# Patient Record
Sex: FEMALE | Race: WHITE
[De-identification: names, ages, dates, MRNs, and addresses within clinical notes are randomized per-mention and may not be internally consistent; named-entity substitution may affect disease eponyms.]

---

## 2018-11-21 ENCOUNTER — HOSPITAL ENCOUNTER (EMERGENCY)
Dept: HOSPITAL 97 - ER | Age: 73
Discharge: HOME | End: 2018-11-21
Payer: COMMERCIAL

## 2018-11-21 DIAGNOSIS — M25.511: Primary | ICD-10-CM

## 2018-11-21 DIAGNOSIS — Z88.2: ICD-10-CM

## 2018-11-21 DIAGNOSIS — I10: ICD-10-CM

## 2018-11-21 PROCEDURE — 99283 EMERGENCY DEPT VISIT LOW MDM: CPT

## 2018-11-21 PROCEDURE — 71046 X-RAY EXAM CHEST 2 VIEWS: CPT

## 2018-11-21 NOTE — ER
Nurse's Notes                                                                                     

 [facility]                                                                

Name: Barrie Jha                                                                                

Age: 73 yrs                                                                                       

Sex: Female                                                                                       

: 1945                                                                                   

MRN: [id number]                                                                                   

Arrival Date: 2018                                                                          

Time: 14:15                                                                                       

Account#: [id number]                                                                            

Bed 30                                                                                            

Private MD: Ghassan Sin T                                                                        

Diagnosis: Pain in right shoulder                                                                 

                                                                                                  

Presentation:                                                                                     

                                                                                             

14:30 Presenting complaint: Patient states: "I fell last night directly on my (right)         aj1 

      shoulder. My  made me this sling so I could sleep. I thought I could see my          

      doctor today, but when I called they said just come to the ER" Limited ROM noted to         

      right shoulder. Transition of care: patient was not received from another setting of        

      care. Onset of symptoms was 2018 at 21:00. Risk Assessment: Do you want to     

      hurt yourself or someone else? Patient reports no desire to harm self or others.            

      Initial Sepsis Screen: Does the patient meet any 2 criteria? No. Patient's initial          

      sepsis screen is negative. Does the patient have a suspected source of infection? No.       

      Patient's initial sepsis screen is negative. Care prior to arrival: None.                   

14:30 Method Of Arrival: Ambulatory                                                           [facility] 

14:30 Acuity: VIANEY 4                                                                           aj1 

                                                                                                  

Triage Assessment:                                                                                

14:33 General: Appears in no apparent distress. uncomfortable, Behavior is calm, cooperative, aj1 

      appropriate for age. Pain: Complains of pain in anterior aspect of right shoulder and       

      posterior aspect of right shoulder. Pain: Pain currently is 0 out of 10 on a pain           

      scale. at worst was 10 out of 10 on a pain scale. Neuro: Level of Consciousness is          

      awake, alert, obeys commands. Cardiovascular: Patient's skin is warm and dry.               

      Respiratory: Airway is patent Respiratory effort is even, unlabored, Respiratory            

      pattern is regular, symmetrical. Musculoskeletal: Range of motion: limited in right         

      shoulder. Injury Description: Patient reports fall from standing. States that she does      

      not have any pain unless she is moving her arm.                                             

                                                                                                  

Historical:                                                                                       

- Allergies:                                                                                      

14:33 Sulfa (Sulfonamide Antibiotics);                                                        aj1 

- Home Meds:                                                                                      

14:33 losartan oral oral [Active]; "an antihistamine" [Active];                               aj1 

- PMHx:                                                                                           

14:33 Hypertension;                                                                           aj1 

                                                                                                  

- Immunization history:: Flu vaccine is up to date.                                               

- Social history:: Smoking status: Patient/guardian denies using tobacco.                         

- Ebola Screening: : Patient denies travel to an Ebola-affected area in the 21 days               

  before illness onset.                                                                           

                                                                                                  

                                                                                                  

Screening:                                                                                        

15:20 Abuse screen: Denies threats or abuse. Denies injuries from another. Nutritional        iw  

      screening: No deficits noted. Tuberculosis screening: No symptoms or risk factors           

      identified. Fall Risk Fall in past 12 months (25 points).                                   

                                                                                                  

Assessment:                                                                                       

15:20 General: Appears in no apparent distress. Behavior is calm, cooperative. Pain:          iw  

      Complains of pain in posterior aspect of right shoulder and anterior aspect of right        

      shoulder. Neuro: Level of Consciousness is awake, alert, obeys commands, Oriented to        

      person, place, time, situation, Moves all extremities. Full function. Cardiovascular:       

      Capillary refill < 3 seconds in bilateral fingers Patient's skin is warm and dry.           

      Respiratory: Respiratory effort is even, unlabored, Respiratory pattern is regular,         

      symmetrical. GI: No signs and/or symptoms were reported involving the gastrointestinal      

      system. Derm: Skin is intact, is healthy with good turgor. Musculoskeletal: Range of        

      motion: intact in all extremities, Reports pain in right shoulder.                          

                                                                                                  

Vital Signs:                                                                                      

14:33  / 87; Pulse 69; Resp 18; Temp 98.4; Pulse Ox 98% on R/A; Weight 72.57 kg (R);    aj1 

      Height 5 ft. 7 in. (170.18 cm) (R); Pain 0/10;                                              

14:33 Body Mass Index 25.06 (72.57 kg, 170.18 cm)                                             aj1 

                                                                                                  

ED Course:                                                                                        

14:15 Patient arrived in ED.                                                                  mr  

14:16 Ghassan Sin MD is Private Physician.                                                   mr  

14:32 Triage completed.                                                                       aj1 

14:33 Arm band placed on Patient placed in waiting room, Patient notified of wait time.       aj1 

15:20 Shahnaz Pace FNP-C is PHCP.                                                        kb  

15:20 Rashad Cool MD is Attending Physician.                                             kb  

15:20 X-ray completed. Patient tolerated procedure well.                                      jb2 

15:20 Patient moved to radiology.                                                             jb2 

15:20 Patient has correct armband on for positive identification.                             iw  

15:20 No provider procedures requiring assistance completed. Patient did not have IV access   iw  

      during this emergency room visit.                                                           

15:21 Patient taken to an exam room, Patient moved back from radiology.                       jb2 

15:22 Shoulder Right (2 View) XRAY In Process Unspecified.                                    EDMS

15:22 Chest Pa And Lat (2 Views) XRAY In Process Unspecified.                                 EDMS

15:29 Loraine Castillo, RN is Primary Nurse.                                                   iw  

                                                                                                  

Administered Medications:                                                                         

No medications were administered                                                                  

                                                                                                  

                                                                                                  

Outcome:                                                                                          

15:54 Discharge ordered by MD.                                                                kb  

16:18 Discharged to home ambulatory.                                                          iw  

16:18 Condition: good                                                                             

16:18 Discharge instructions given to patient, Instructed on discharge instructions, follow       

      up and referral plans. medication usage, Demonstrated understanding of instructions,        

      follow-up care, medications, Prescriptions given X 2.                                       

16:19 Patient left the ED.                                                                    iw  

                                                                                                  

Signatures:                                                                                       

Dispatcher [employer]                           EDMS                                                 

Shahnaz Pace, FNP-C                 FNP-Na [place], RN                     RN   aj1                                                  

Sheila Valenzuela Jesse                              jb2                                                  

Loraine Castillo, RN                     RN   iw                                                   

                                                                                                  

**************************************************************************************************

## 2018-11-21 NOTE — EDPHYS
Physician Documentation                                                                           

 [facility]                                                                

Name: Barrie Jha                                                                                

Age: 73 yrs                                                                                       

Sex: Female                                                                                       

: 1945                                                                                   

MRN: [id number]                                                                                   

Arrival Date: 2018                                                                          

Time: 14:15                                                                                       

Account#: [id number]                                                                            

Bed 30                                                                                            

Private MD: Ghassan Sin T                                                                        

ED Physician Rashad Cool                                                                      

HPI:                                                                                              

                                                                                             

15:52 This 73 yrs old  Female presents to ER via Ambulatory with complaints of       kb  

      Shoulder Injury.                                                                            

15:52 The patient or guardian complains of decreased range of motion, pain, that is acute.    kb  

      right shoulder. Context: The problem was sustained outdoors, resulted from a fall,          

      while walking, The patient experiences decreased range of motion, when attempts to          

      raise arm, The patient reports no obvious deformity. Onset: The symptoms/episode            

      began/occurred yesterday. Modifying factors: the symptoms are alleviated by nothing.        

      The symptoms are aggravated by movement. Associated signs and symptoms: The patient has     

      no apparent associated signs or symptoms. Severity of symptoms: At their worst the          

      symptoms were severe, in the emergency department the symptoms have improved,               

      moderately. Treatment prior to arrival includes: over the counter medications, NSAIDS,      

      sling. The patient has not experienced similar symptoms in the past. The patient has        

      not recently seen a physician. pt reports she tripped last night and landed on right        

      shoulder. .                                                                                 

                                                                                                  

Historical:                                                                                       

- Allergies:                                                                                      

14:33 Sulfa (Sulfonamide Antibiotics);                                                        aj1 

- Home Meds:                                                                                      

14:33 losartan oral oral [Active]; "an antihistamine" [Active];                               aj1 

- PMHx:                                                                                           

14:33 Hypertension;                                                                           aj1 

                                                                                                  

- Immunization history:: Flu vaccine is up to date.                                               

- Social history:: Smoking status: Patient/guardian denies using tobacco.                         

- Ebola Screening: : Patient denies travel to an Ebola-affected area in the 21 days               

  before illness onset.                                                                           

                                                                                                  

                                                                                                  

ROS:                                                                                              

15:52 Constitutional: Negative for fever, chills, and weight loss, Cardiovascular: Negative   kb  

      for chest pain, palpitations, and edema, Respiratory: Negative for shortness of breath,     

      cough, wheezing, and pleuritic chest pain, Abdomen/GI: Negative for abdominal pain,         

      nausea, vomiting, diarrhea, and constipation, Skin: Negative for injury, rash, and          

      discoloration, Neuro: Negative for headache, weakness, numbness, tingling, and seizure.     

15:52 MS/extremity: Positive for decreased range of motion, pain, tenderness, Negative for        

      acute changes, injury or acute deformity, abrasion, bite, contusion, deformity,             

      ecchymosis, erythema, laceration, paresthesias, puncture, rash, swelling, tingling,         

      warmth.                                                                                     

                                                                                                  

Exam:                                                                                             

15:52 Constitutional:  This is a well developed, well nourished patient who is awake, alert,  kb  

      and in no acute distress. Head/Face:  Normocephalic, atraumatic. Chest/axilla:  Normal      

      chest wall appearance and motion.  Nontender with no deformity.  No lesions are             

      appreciated. Cardiovascular:  Regular rate and rhythm with a normal S1 and S2.  No          

      gallops, murmurs, or rubs.  Normal PMI, no JVD.  No pulse deficits. Respiratory:  Lungs     

      have equal breath sounds bilaterally, clear to auscultation and percussion.  No rales,      

      rhonchi or wheezes noted.  No increased work of breathing, no retractions or nasal          

      flaring. Abdomen/GI:  Soft, non-tender, with normal bowel sounds.  No distension or         

      tympany.  No guarding or rebound.  No evidence of tenderness throughout. Skin:  Warm,       

      dry with normal turgor.  Normal color with no rashes, no lesions, and no evidence of        

      cellulitis. Neuro:  Awake and alert, GCS 15, oriented to person, place, time, and           

      situation.  Cranial nerves II-XII grossly intact.  Motor strength 5/5 in all                

      extremities.  Sensory grossly intact.  Cerebellar exam normal.  Normal gait.                

15:52 Musculoskeletal/extremity: Extremities: grossly normal except: noted in the right           

      shoulder: decreased ROM, pain, tenderness, ROM: limited active range of motion due to       

      pain, in the right shoulder, Circulation is intact in all extremities. Sensation            

      intact.                                                                                     

                                                                                                  

Vital Signs:                                                                                      

14:33  / 87; Pulse 69; Resp 18; Temp 98.4; Pulse Ox 98% on R/A; Weight 72.57 kg (R);    aj1 

      Height 5 ft. 7 in. (170.18 cm) (R); Pain 0/10;                                              

14:33 Body Mass Index 25.06 (72.57 kg, 170.18 cm)                                             aj1 

                                                                                                  

MDM:                                                                                              

15:20 Patient medically screened.                                                             kb  

15:52 Data reviewed: vital signs, nurses notes. Data interpreted: Pulse oximetry: on room air kb  

      is 98 %. Interpretation: normal. Counseling: I had a detailed discussion with the           

      patient and/or guardian regarding: the historical points, exam findings, and any            

      diagnostic results supporting the discharge/admit diagnosis, radiology results, the         

      need for outpatient follow up, a orthopedic surgeon, to return to the emergency             

      department if symptoms worsen or persist or if there are any questions or concerns that     

      arise at home.                                                                              

                                                                                                  

                                                                                             

14:35 Order name: Shoulder Right (2 View) XRAY; Complete Time: 15:39                          aj1 

                                                                                             

14:53 Order name: Chest Pa And Lat (2 Views) XRAY; Complete Time: 15:39                       snw 

                                                                                             

15:52 Order name: Sling; Complete Time: 18:28                                                 kb  

                                                                                                  

Administered Medications:                                                                         

No medications were administered                                                                  

                                                                                                  

                                                                                                  

Disposition:                                                                                      

18 15:54 Discharged to Home. Impression: Pain in right shoulder.                            

- Condition is Stable.                                                                            

- Discharge Instructions: Shoulder Pain, Easy-to-Read.                                            

- Prescriptions for Diclofenac Sodium 75 mg Oral Tablet, Delayed Release (E.C.) - take            

  1 tablet by ORAL route 2 times per day As needed; 30 tablet. orphenadrine citrate 100           

  mg Oral Tablet Sustained Release - take 1 tablet by ORAL route 2 times per day As               

  needed; 20 tablet.                                                                              

- Medication Reconciliation Form, Thank You Letter, Antibiotic Education, Prescription            

  Opioid Use form.                                                                                

- Follow up: Emergency Department; When: As needed; Reason: Worsening of condition.               

  Follow up: Private Physician; When: 2 - 3 days; Reason: Recheck today's complaints,             

  Continuance of care, Re-evaluation by your physician.                                           

                                                                                                  

                                                                                                  

                                                                                                  

Addendum:                                                                                         

2018                                                                                        

     06:23 Co-signature as Attending Physician, Rashad Cool MD I agree with the assessment and  c
ha

           plan of care.                                                                          

                                                                                                  

Signatures:                                                                                       

Dispatcher [employer]                           Shahnaz Alicia, FNP-C                 FNP-Ckb                                                   

Na Neal RN                     RN   ajRashad Bledsoe MD MD cha Williams, Irene RN                     RN   iw                                                   

                                                                                                  

Corrections: (The following items were deleted from the chart)                                    

                                                                                             

16:19 15:54 2018 15:54 Discharged to Home. Impression: Pain in right shoulder.          iw  

      Condition is Stable. Forms are Medication Reconciliation Form, Thank You Letter,            

      Antibiotic Education, Prescription Opioid Use. Follow up: Emergency Department; When:       

      As needed; Reason: Worsening of condition. Follow up: Private Physician; When: 2 - 3        

      days; Reason: Recheck today's complaints, Continuance of care, Re-evaluation by your        

      physician. kb                                                                               

                                                                                                  

**************************************************************************************************

## 2018-11-21 NOTE — RAD REPORT
EXAM DESCRIPTION:  RAD - Shoulder  Right 2 View - 11/21/2018 3:25 pm

 

CLINICAL HISTORY:  PAIN

Fall

 

COMPARISON:  No comparisons

 

FINDINGS:  Mild AC joint and glenohumeral joint arthritic changes are present. No acute fracture or d
islocation seen.

## 2022-03-30 ENCOUNTER — HOSPITAL ENCOUNTER (EMERGENCY)
Dept: HOSPITAL 97 - ER | Age: 77
LOS: 1 days | Discharge: HOME | End: 2022-03-31
Payer: COMMERCIAL

## 2022-03-30 DIAGNOSIS — I10: ICD-10-CM

## 2022-03-30 DIAGNOSIS — K80.80: Primary | ICD-10-CM

## 2022-03-30 DIAGNOSIS — Z88.2: ICD-10-CM

## 2022-03-30 LAB
ALBUMIN SERPL BCP-MCNC: 3.7 G/DL (ref 3.4–5)
ALP SERPL-CCNC: 116 U/L (ref 45–117)
ALT SERPL W P-5'-P-CCNC: 106 U/L (ref 12–78)
AST SERPL W P-5'-P-CCNC: 27 U/L (ref 15–37)
BUN BLD-MCNC: 25 MG/DL (ref 7–18)
GLUCOSE SERPLBLD-MCNC: 199 MG/DL (ref 74–106)
HCT VFR BLD CALC: 38 % (ref 36–45)
LIPASE SERPL-CCNC: 191 U/L (ref 73–393)
LYMPHOCYTES # SPEC AUTO: 2 K/UL (ref 0.7–4.9)
PMV BLD: 8.7 FL (ref 7.6–11.3)
POTASSIUM SERPL-SCNC: 3.1 MMOL/L (ref 3.5–5.1)
RBC # BLD: 4.24 M/UL (ref 3.86–4.86)

## 2022-03-30 PROCEDURE — 96375 TX/PRO/DX INJ NEW DRUG ADDON: CPT

## 2022-03-30 PROCEDURE — 36415 COLL VENOUS BLD VENIPUNCTURE: CPT

## 2022-03-30 PROCEDURE — 76705 ECHO EXAM OF ABDOMEN: CPT

## 2022-03-30 PROCEDURE — 80053 COMPREHEN METABOLIC PANEL: CPT

## 2022-03-30 PROCEDURE — 85025 COMPLETE CBC W/AUTO DIFF WBC: CPT

## 2022-03-30 PROCEDURE — 96374 THER/PROPH/DIAG INJ IV PUSH: CPT

## 2022-03-30 PROCEDURE — 99284 EMERGENCY DEPT VISIT MOD MDM: CPT

## 2022-03-30 PROCEDURE — 83690 ASSAY OF LIPASE: CPT

## 2022-03-31 VITALS — DIASTOLIC BLOOD PRESSURE: 72 MMHG | OXYGEN SATURATION: 97 % | SYSTOLIC BLOOD PRESSURE: 140 MMHG

## 2022-03-31 VITALS — TEMPERATURE: 98.1 F

## 2022-03-31 NOTE — RAD REPORT
EXAM DESCRIPTION:  US - Abdomen Exam Limited - 3/30/2022 11:52 pm

 

CLINICAL HISTORY:  76 years, Female, r/o GB;Abd pain

 

COMPARISON:  None

.

 

TECHNIQUE:  Utilizing a curved array transducer, real-time ultrasound evaluation of the abdominal vis
cera was performed. Color Doppler imaging was used to assess vascular flow.

 

FINDINGS:  The liver was not completely imaged although demonstrate increased echogenicity correspond
ing to fatty infiltration. There is no definitive evidence for significant biliary duct dictation wit
hin the visualized images.

The gallbladder demonstrate presence of a tiny layering echogenic material with minimal posterior sha
dowing corresponding to cholelithiasis/gravel.   No pericholecystic fluid and or wall thickening was 
identified.   The common bile duct measures 6.7 mm.   No lytic the extrahepatic biliary duct dilatati
on was identified.

The pancreas, right were not imaged.

 

IMPRESSION:  VERY LIMITED ULTRASOUND DEMONSTRATING CHOLELITHIASIS.

FATTY INFILTRATION OF THE LIVER INCOMPLETELY EVALUATED.

 

Electronically signed by:   Fareed Webb MD   3/30/2022 10:50 PM CDT Workstation: [id number]

 

 

Due to temporary technical issues with the PACS/Fluency reporting system, reports are being signed by
 the in house radiologist without review as a courtesy to ensure prompt reporting. The interpreting r
adiologist is fully responsible for the content of the report.

## 2022-03-31 NOTE — ER
Nurse's Notes                                                                                     

 [facility]                                                                 

Name: Barrie Jha                                                                                

Age: 76 yrs                                                                                       

Sex: Female                                                                                       

: 1945                                                                                   

MRN: [id number]                                                                                   

Arrival Date: 2022                                                                          

Time: 20:10                                                                                       

Account#: [id number]                                                                            

Bed 20                                                                                            

Private MD:                                                                                       

Diagnosis: Other cholelithiasis without obstruction                                               

                                                                                                  

Presentation:                                                                                     

                                                                                             

20:17 Chief complaint: Patient states: right upper quadrant pain starting Friday radiating to lg3 

      the back with fever and chills. pain stopped Saturday. saw dr Bennett yesterday and was      

      told to follow up with ultrasound. ultrasound scheduled for . pain started         

      again today and not easing up. Coronavirus screen: Client denies travel out of the [place]     

      in the last 14 days. At this time, the client does not indicate any symptoms associated     

      with coronavirus-19. Ebola Screen: No symptoms or risks identified at this time.            

      Initial Sepsis Screen: Does the patient meet any 2 criteria? No. Patient's initial          

      sepsis screen is negative. Does the patient have a suspected source of infection? No.       

      Patient's initial sepsis screen is negative. Risk Assessment: Do you want to hurt           

      yourself or someone else? Patient reports no desire to harm self or others. Onset of        

      symptoms was 2022.                                                                

20:17 Method Of Arrival: Ambulatory                                                           lg3 

20:17 Acuity: VIANEY 3                                                                           lg3 

                                                                                                  

Triage Assessment:                                                                                

20:21 General: Appears in no apparent distress. uncomfortable, Behavior is calm, cooperative. lg3 

      Pain: Complains of pain in right upper quadrant Pain radiates to back. EENT: No             

      deficits noted. No signs and/or symptoms were reported regarding the EENT system.           

      Neuro: No deficits noted. Level of Consciousness is awake, alert, obeys commands,           

      Oriented to person, place, time, situation. Cardiovascular: No deficits noted. Denies       

      chest pain, shortness of breath. Respiratory: No deficits noted. Airway is patent           

      Trachea midline Respiratory effort is even, unlabored, Respiratory pattern is regular,      

      symmetrical. GI: Reports upper abdominal pain, cramping, intolerance of fluids,             

      intolerance of food, nausea, vomiting. : No deficits noted. No signs and/or symptoms      

      were reported regarding the genitourinary system. Derm: No deficits noted. No signs         

      and/or symptoms reported regarding the dermatologic system. Skin is intact, is healthy      

      with good turgor, Skin is dry. Musculoskeletal: No deficits noted. No signs and/or          

      symptoms reported regarding the musculoskeletal system. Circulation, motion, and            

      sensation intact. Range of motion: intact in all extremities.                               

                                                                                                  

Historical:                                                                                       

- Allergies:                                                                                      

20:21 Sulfa (Sulfonamide Antibiotics);                                                        lg3 

- Home Meds:                                                                                      

20:21 "an antihistamine" [Active]; Atenolol Oral [Active]; Metformin Oral [Active];           lg3 

- PMHx:                                                                                           

20:21 Hypertension;                                                                           lg3 

- PSHx:                                                                                           

20:21 None;                                                                                   lg3 

                                                                                                  

- Immunization history:: Adult Immunizations up to date, Client reports receiving the             

  2nd dose of the Covid vaccine, moderna X2.                                                      

- Social history:: Smoking status: Patient denies any tobacco usage or history of.                

  Patient/guardian denies using alcohol.                                                          

                                                                                                  

                                                                                                  

Screenin:25 Abuse screen: Denies threats or abuse. Denies injuries from another. Nutritional        lg3 

      screening: No deficits noted. Tuberculosis screening: No symptoms or risk factors           

      identified. Fall Risk None identified.                                                      

                                                                                                  

Assessment:                                                                                       

21:33 General: Appears in no apparent distress. Behavior is cooperative, appropriate for age. sm5 

      Pain: Complains of pain in back and abdomen and right upper quadrant. Neuro: No             

      deficits noted. Level of Consciousness is awake, alert, obeys commands, Oriented to         

      person, place, time, situation. Cardiovascular: No deficits noted. Capillary refill < 3     

      seconds Patient's skin is warm and dry. Respiratory: No deficits noted. Airway is           

      patent Trachea midline Respiratory effort is even, unlabored. GI: Bowel sounds present      

      X 4 quads. Abd is soft Reports intolerance of food, nausea, vomiting.                       

22:45 Reassessment: No changes from previously documented assessment.                         sm5 

23:57 Reassessment: No changes from previously documented assessment. Patient and/or family   sm5 

      updated on plan of care and expected duration. Pain level reassessed.                       

                                                                                             

00:20 Reassessment: No changes from previously documented assessment.                         sm5 

                                                                                                  

Vital Signs:                                                                                      

                                                                                             

20:17  / 62; Pulse 54; Resp 16 S; Temp 98.1(O); Pulse Ox 100% on R/A; Weight 72.57 kg   lg3 

      (R); Height 5 ft. 7 in. (170.18 cm) (R); Pain 8/10;                                         

21:34  / 69; Pulse 59; Resp 18; Pulse Ox 95% on R/A;                                    sm5 

                                                                                             

01:11  / 72; Pulse 57; Resp 17; Pulse Ox 97% on R/A;                                    sm5 

                                                                                             

20:17 Body Mass Index 25.06 (72.57 kg, 170.18 cm)                                             lg3 

                                                                                                  

ED Course:                                                                                        

                                                                                             

20:10 Patient arrived in ED.                                                                  kz  

20:21 Triage completed.                                                                       lg3 

20:21 Arm band placed on right wrist.                                                         lg3 

20:40 Shawn Hollis PA is PHCP.                                                               [email or web address] 

20:40 Rashad Cool MD is Attending Physician.                                             [email or web address] 

20:49 Maddison Robles, RN is Primary Nurse.                                                      sm5 

21:07 Inserted saline lock: 20 gauge in right antecubital area, using aseptic technique.      sm5 

      Blood collected.                                                                            

21:16 CBC with Diff Sent.                                                                     sm5 

21:16 CMP Sent.                                                                               sm5 

21:16 Lipase Sent.                                                                            sm5 

21:34 Patient has correct armband on for positive identification. Bed in low position. Call   5 

      light in reach. Side rails up X2. Pulse ox on. NIBP on.                                     

22:36 US Abdomen Limited In Process Unspecified.                                              EDMS

                                                                                             

00:31 Bora Clark MD is Referral Physician.                                               [email or web address] 

01:11 No provider procedures requiring assistance completed. IV discontinued, intact,         sm5 

      bleeding controlled, No redness/swelling at site. Pressure dressing applied.                

                                                                                                  

Administered Medications:                                                                         

                                                                                             

21:51 Drug: Demerol (meperidine) 25 mg Route: IVP; Site: right antecubital;                   5 

21:51 Drug: Zofran (Ondansetron) 4 mg Route: IVP; Site: right antecubital;                    sm5 

                                                                                             

00:33 Drug: Demerol (meperidine) 25 mg Route: IVP; Site: right antecubital;                   5 

00:33 Drug: Promethazine 12.5 mg Route: IVP; Site: right antecubital;                         5 

                                                                                                  

                                                                                                  

Outcome:                                                                                          

00:31 Discharge ordered by MD.                                                                [email or web address] 

01:11 Discharged to home ambulatory, with family.                                             sm5 

01:11 Condition: stable                                                                           

01:11 Discharge instructions given to patient, family, Instructed on discharge instructions,      

      follow up and referral plans. medication usage, Demonstrated understanding of               

      instructions, follow-up care, medications, Prescriptions given X 3.                         

01:12 Patient left the ED.                                                                    sm5 

                                                                                                  

Signatures:                                                                                       

Dispatcher [employer]                           EDMS                                                 

Shawn Hollis PA PA jr8                                                  

Brandi Tapia RN                       RN   lg3                                                  

Maddison Robles RN                        RN   sm5                                                  

Minerva Ontiveros                                                   

                                                                                                  

**************************************************************************************************

## 2022-03-31 NOTE — EDPHYS
Physician Documentation                                                                           

 [facility]                                                                 

Name: Barrie Jha                                                                                

Age: 76 yrs                                                                                       

Sex: Female                                                                                       

: 1945                                                                                   

MRN: [id number]                                                                                   

Arrival Date: 2022                                                                          

Time: 20:10                                                                                       

Account#: [id number]                                                                            

Bed 20                                                                                            

Private MD:                                                                                       

ED Physician Rashad Cool                                                                      

HPI:                                                                                              

                                                                                             

21:55 This 76 yrs old Female presents to ER via Ambulatory with complaints of Abdominal Pain  jr8 

      - Right upper.                                                                              

21:55 The patient presents with abdominal pain in the right upper quadrant. Onset: The        jr8 

      symptoms/episode began/occurred acutely, today. The symptoms radiate to right back.         

      Associated signs and symptoms: Pertinent positives: nausea and vomiting. The symptoms       

      are described as stabbing. Modifying factors: The symptoms are alleviated by nothing,       

      the symptoms are aggravated by food. Severity of pain: At its worst the pain was            

      moderate in the emergency department the pain is unchanged. The patient has experienced     

      a previous episode. The patient has not recently seen a physician.                          

                                                                                                  

Historical:                                                                                       

- Allergies:                                                                                      

20:21 Sulfa (Sulfonamide Antibiotics);                                                        lg3 

- Home Meds:                                                                                      

20:21 "an antihistamine" [Active]; Atenolol Oral [Active]; Metformin Oral [Active];           lg3 

- PMHx:                                                                                           

20:21 Hypertension;                                                                           lg3 

- PSHx:                                                                                           

20:21 None;                                                                                   lg3 

                                                                                                  

- Immunization history:: Adult Immunizations up to date, Client reports receiving the             

  2nd dose of the Covid vaccine, moderna X2.                                                      

- Social history:: Smoking status: Patient denies any tobacco usage or history of.                

  Patient/guardian denies using alcohol.                                                          

                                                                                                  

                                                                                                  

ROS:                                                                                              

21:55 Eyes: Negative for injury, pain, redness, and discharge, ENT: Negative for injury,      jr8 

      pain, and discharge, Neck: Negative for injury, pain, and swelling, Cardiovascular:         

      Negative for chest pain, palpitations, and edema, Respiratory: Negative for shortness       

      of breath, cough, wheezing, and pleuritic chest pain, Back: Negative for injury and         

      pain, MS/Extremity: Negative for injury and deformity, Skin: Negative for injury, rash,     

      and discoloration, Neuro: Negative for headache, weakness, numbness, tingling, and          

      seizure.                                                                                    

21:55 Abdomen/GI: Positive for abdominal pain, nausea and vomiting, Negative for diarrhea.        

                                                                                                  

Exam:                                                                                             

21:55 Constitutional:  This is a well developed, well nourished patient who is awake, alert,  jr8 

      and in no acute distress. Cardiovascular:  Regular rate and rhythm with a normal S1 and     

      S2.  No gallops, murmurs, or rubs.  Normal PMI, no JVD.  No pulse deficits.                 

      Respiratory:  Lungs have equal breath sounds bilaterally, clear to auscultation and         

      percussion.  No rales, rhonchi or wheezes noted.  No increased work of breathing, no        

      retractions or nasal flaring. Back:  No spinal tenderness.  No costovertebral               

      tenderness.  Full range of motion. Skin:  Warm, dry with normal turgor.  Normal color       

      with no rashes, no lesions, and no evidence of cellulitis. MS/ Extremity:  Pulses           

      equal, no cyanosis.  Neurovascular intact.  Full, normal range of motion. Neuro:  Awake     

      and alert, GCS 15, oriented to person, place, time, and situation.  Cranial nerves          

      II-XII grossly intact.  Motor strength 5/5 in all extremities.  Sensory grossly intact.     

                                                                                                  

21:55 Abdomen/GI: Inspection: obese Bowel sounds: active, all quadrants, Palpation: soft, in      

      all quadrants, moderate abdominal tenderness, in the right upper quadrant, mass, is not     

      appreciated, rebound tenderness, is not appreciated, voluntary guarding, is not             

      appreciated, involuntary guarding, is not appreciated, no appreciated organomegaly,         

      Indicators: McBurney's point is not tender, Roy's sign is negative, Rovsing's sign       

      is negative, Liver: tenderness, is not appreciated.                                         

                                                                                                  

Vital Signs:                                                                                      

20:17  / 62; Pulse 54; Resp 16 S; Temp 98.1(O); Pulse Ox 100% on R/A; Weight 72.57 kg   lg3 

      (R); Height 5 ft. 7 in. (170.18 cm) (R); Pain 8/10;                                         

21:34  / 69; Pulse 59; Resp 18; Pulse Ox 95% on R/A;                                    5 

                                                                                             

01:11  / 72; Pulse 57; Resp 17; Pulse Ox 97% on R/A;                                    5 

                                                                                             

20:17 Body Mass Index 25.06 (72.57 kg, 170.18 cm)                                             lg3 

                                                                                                  

MDM:                                                                                              

                                                                                             

20:43 Patient medically screened.                                                             [facility] 

                                                                                             

00:26 Data reviewed: vital signs, nurses notes, lab test result(s), radiologic studies,       jr8 

      ultrasound. Data interpreted: Pulse oximetry: on room air is 95 %. Interpretation:          

      normal. Counseling: I had a detailed discussion with the patient and/or guardian            

      regarding: the historical points, exam findings, and any diagnostic results supporting      

      the discharge/admit diagnosis, lab results, radiology results, the need for outpatient      

      follow up, a general surgeon, to return to the emergency department if symptoms worsen      

      or persist or if there are any questions or concerns that arise at home. ED course:         

      Offered to admit patient as patient still having pain and vomited one more time but         

      wants to try outpatient meds at this time. Patient will follow up with Dr. Clark and      

      call for seen in the morning. Knows to come back if she were to worsen a point time.        

      Patient hemodynamically stable and labs unremarkable. Ultrasound shows cholelithiasis       

      without CBD dilation or wall thickening or pericholecystic fluid. Stable to go home at      

      this time..                                                                                 

                                                                                                  

                                                                                             

20:42 Order name: CBC with Diff; Complete Time: 21:34                                         jr8 

                                                                                             

20:42 Order name: CMP; Complete Time: 21:57                                                   jr8 

                                                                                             

20:42 Order name: Lipase; Complete Time: 21:57                                                jr8 

                                                                                             

21:36 Order name: US Abdomen Limited                                                          jr8 

                                                                                             

20:42 Order name: IV Saline Lock; Complete Time: 21:16                                        jr8 

                                                                                             

20:42 Order name: Labs collected and sent; Complete Time: 21:16                               jr8 

                                                                                                  

Administered Medications:                                                                         

                                                                                             

21:51 Drug: Demerol (meperidine) 25 mg Route: IVP; Site: right antecubital;                   sm5 

21:51 Drug: Zofran (Ondansetron) 4 mg Route: IVP; Site: right antecubital;                    sm5 

                                                                                             

00:33 Drug: Demerol (meperidine) 25 mg Route: IVP; Site: right antecubital;                   sm5 

00:33 Drug: Promethazine 12.5 mg Route: IVP; Site: right antecubital;                         sm5 

                                                                                                  

                                                                                                  

Disposition:                                                                                      

06:41 Co-signature as Attending Physician, Rashad Cool MD I agree with the assessment and  wily 

      plan of care.                                                                               

                                                                                                  

Disposition Summary:                                                                              

22 00:31                                                                                    

Discharge Ordered                                                                                 

      Location: Home                                                                          [facility] 

      Problem: new                                                                            jr8 

      Symptoms: have improved                                                                 jr8 

      Condition: Stable                                                                       jr8 

      Diagnosis                                                                                   

        - Other cholelithiasis without obstruction                                            jr8 

      Followup:                                                                               jr8 

        - With: Bora Clark MD                                                                 

        - When: Tomorrow                                                                           

        - Reason: Recheck today's complaints, Continuance of care, Re-evaluation by your           

      physician                                                                                   

      Discharge Instructions:                                                                     

        - Discharge Summary Sheet                                                             jr8 

        - Cholelithiasis                                                                      jr8 

        - Gallbladder Eating Plan                                                             jr8 

      Forms:                                                                                      

        - Medication Reconciliation Form                                                      jr8 

        - Thank You Letter                                                                    jr8 

        - Antibiotic Education                                                                jr8 

        - Prescription Opioid Use                                                             jr8 

      Prescriptions:                                                                              

        - Cipro 500 mg Oral Tablet                                                                 

            - take 1 tablet by ORAL route every 12 hours for 7 days; 14 tablet; Refills: 0,   jr8 

      Product Selection Permitted                                                                 

        - promethazine 25 mg Oral Tablet                                                           

            - take 1 tablet by ORAL route every 6 hours As needed; 20 tablet; Refills: 0,     jr8 

      Product Selection Permitted                                                                 

        - Tylenol-Codeine #3 300 mg-30 mg Oral                                                     

            - take 2 tablet by ORAL route every 8 hours As needed; 20 tablet; Refills: 0,     jr8 

      Product Selection Permitted                                                                 

Signatures:                                                                                       

Dispatcher [employer]                           Rashad Liz MD MD cha Roszak, Josh, PA                        PA   jr8                                                  

Brandi Tapia, RN                       RN   lg3                                                  

Maddison Robles RN                        RN   sm5                                                  

                                                                                                  

**************************************************************************************************

## 2022-04-06 LAB
ALBUMIN SERPL BCP-MCNC: 3.5 G/DL (ref 3.4–5)
ALP SERPL-CCNC: 323 U/L (ref 45–117)
ALT SERPL W P-5'-P-CCNC: 151 U/L (ref 12–78)
AMYLASE SERPL-CCNC: 73 U/L (ref 25–115)
AST SERPL W P-5'-P-CCNC: 39 U/L (ref 15–37)
BUN BLD-MCNC: 27 MG/DL (ref 7–18)
GLUCOSE SERPLBLD-MCNC: 189 MG/DL (ref 74–106)
POTASSIUM SERPL-SCNC: 3.2 MMOL/L (ref 3.5–5.1)

## 2022-04-06 NOTE — RAD REPORT
EXAM DESCRIPTION:  RAD - Chest Pa And Lat (2 Views) - 4/6/2022 2:14 pm

 

CLINICAL HISTORY:  Pre op pending cholecystectomy

Chest pain.

 

COMPARISON:  Chest Pa And Lat (2 Views) dated 11/21/2018

 

FINDINGS:  The lungs are clear. The heart is normal in size. No displaced fractures.

 

IMPRESSION:  No acute or concerning finding suspected.

## 2022-04-11 ENCOUNTER — HOSPITAL ENCOUNTER (OUTPATIENT)
Dept: HOSPITAL 97 - OR | Age: 77
Discharge: HOME | End: 2022-04-11
Attending: SURGERY
Payer: COMMERCIAL

## 2022-04-11 VITALS — SYSTOLIC BLOOD PRESSURE: 112 MMHG | OXYGEN SATURATION: 97 % | DIASTOLIC BLOOD PRESSURE: 76 MMHG

## 2022-04-11 VITALS — TEMPERATURE: 97.8 F

## 2022-04-11 DIAGNOSIS — K80.10: ICD-10-CM

## 2022-04-11 DIAGNOSIS — K42.9: Primary | ICD-10-CM

## 2022-04-11 DIAGNOSIS — Z20.822: ICD-10-CM

## 2022-04-11 DIAGNOSIS — Z88.2: ICD-10-CM

## 2022-04-11 DIAGNOSIS — K80.50: ICD-10-CM

## 2022-04-11 PROCEDURE — 80048 BASIC METABOLIC PNL TOTAL CA: CPT

## 2022-04-11 PROCEDURE — 36415 COLL VENOUS BLD VENIPUNCTURE: CPT

## 2022-04-11 PROCEDURE — 0FT44ZZ RESECTION OF GALLBLADDER, PERCUTANEOUS ENDOSCOPIC APPROACH: ICD-10-PCS

## 2022-04-11 PROCEDURE — 49585: CPT

## 2022-04-11 PROCEDURE — 0WQF0ZZ REPAIR ABDOMINAL WALL, OPEN APPROACH: ICD-10-PCS

## 2022-04-11 PROCEDURE — 82150 ASSAY OF AMYLASE: CPT

## 2022-04-11 PROCEDURE — 88302 TISSUE EXAM BY PATHOLOGIST: CPT

## 2022-04-11 PROCEDURE — 71046 X-RAY EXAM CHEST 2 VIEWS: CPT

## 2022-04-11 PROCEDURE — 88304 TISSUE EXAM BY PATHOLOGIST: CPT

## 2022-04-11 PROCEDURE — 47562 LAPAROSCOPIC CHOLECYSTECTOMY: CPT

## 2022-04-11 PROCEDURE — 82947 ASSAY GLUCOSE BLOOD QUANT: CPT

## 2022-04-11 PROCEDURE — 80076 HEPATIC FUNCTION PANEL: CPT

## 2022-04-11 PROCEDURE — 93005 ELECTROCARDIOGRAM TRACING: CPT

## 2022-04-11 RX ADMIN — PROMETHAZINE HYDROCHLORIDE ONE MG: 25 INJECTION INTRAMUSCULAR; INTRAVENOUS at 10:39

## 2022-04-11 RX ADMIN — PROMETHAZINE HYDROCHLORIDE ONE MG: 25 INJECTION INTRAMUSCULAR; INTRAVENOUS at 10:32

## 2022-04-11 RX ADMIN — HYDROMORPHONE HYDROCHLORIDE ONE MG: 1 INJECTION, SOLUTION INTRAMUSCULAR; INTRAVENOUS; SUBCUTANEOUS at 10:57

## 2022-04-11 RX ADMIN — HYDROMORPHONE HYDROCHLORIDE ONE MG: 1 INJECTION, SOLUTION INTRAMUSCULAR; INTRAVENOUS; SUBCUTANEOUS at 10:24

## 2022-04-11 RX ADMIN — HYDROMORPHONE HYDROCHLORIDE ONE MG: 1 INJECTION, SOLUTION INTRAMUSCULAR; INTRAVENOUS; SUBCUTANEOUS at 10:43

## 2022-04-11 RX ADMIN — HYDROMORPHONE HYDROCHLORIDE ONE MG: 1 INJECTION, SOLUTION INTRAMUSCULAR; INTRAVENOUS; SUBCUTANEOUS at 10:34

## 2022-04-11 NOTE — P.OP
Primary procedure: Laparoscopic cholecystectomy, repair of umbilical hernia


Findings: As above


Operative Technique: 


Patient brought to the OR and placed in supine position.  General anesthesia 

begun and patient prepped and draped in the usual sterile fashion.  0.5% 

Marcaine infiltrated locally.  Patient had a 2 cm periumbilical hernia.  A 2 cm 

incision made over the umbilical hernia.  Subcutaneous tissue divided hernia sac

and contents identified and excised.  Hernia sac and contents sent to pathology 

as specimen.  #1 Vicryl stay suture placed.  Peritoneal cavity entered with 

sharp and blunt dissection under direct vision.  12 mm trocar placed into the 

peritoneal cavity under direct vision.  Pneumoperitoneum established.  5 mm 

trocar placed in the epigastric region just to the right middle midline.  Two 5 

mm trochars placed in the right subcostal region.  Laparoscopy revealed chronic 

inflammation of the gallbladder with omental adhesion.  The gallbladder was 

thick-walled as well.  Adhesions taken down with sharp and blunt dissection.  

Fundus identified and retracted superiorly.  Infundibulum identified and 

retracted inferolaterally.  Cystic duct cystic artery clearly identified with 

blunt dissection.  Both structures divided after clips are being appropriately 

placed.  Then cautery used to remove the gallbladder from the liver bed.  

Bleeding on the liver bed controlled with cautery.  Gallbladder retrieved 

through the umbilicus via Endo Catch bag.  Right upper quadrant irrigated.  

Effluent clear with no evidence of bleeding or bile leakage appreciated.  All 

trochars removed under direct vision.  Stay sutures tied to each other to 

reapproximate the hernia defect.  Subcutaneous wounds irrigated bleeding 

controlled with cautery.  3-0 chromic used to reapproximate subcutaneous tissue 

and closed skin.  Sterile dressing applied.  Patient awakened taken to recovery 

in good general condition.

## 2022-04-11 NOTE — P.OP
Primary procedure: Laparoscopic cholecystectomy, repair of umbilical hernia


Findings: As above


Operative Technique: 


Patient brought to the OR and placed in supine position.  General anesthesia 

begun and patient prepped and draped in the usual sterile fashion.  0.5% 

Marcaine infiltrated locally.  Patient had a 2 cm periumbilical hernia.  A 2 cm 

incision made over the umbilical hernia.  Subcutaneous tissue divided hernia sac

and contents identified and excised.  Hernia sac and contents sent to pathology 

as specimen.  #1 Vicryl stay suture placed.  Peritoneal cavity entered with 

sharp and blunt dissection under direct vision.  12 mm trocar placed into the 

peritoneal cavity under direct vision.  Pneumoperitoneum established.  5 mm 

trocar placed in the epigastric region just to the right middle midline.  Two 5 

mm trochars placed in the right subcostal region.  Laparoscopy revealed chronic 

inflammation of the gallbladder with omental adhesion.  The gallbladder was 

thick-walled as well.  Adhesions taken down with sharp and blunt dissection.  

Fundus identified and retracted superiorly.  Infundibulum identified and 

retracted inferolaterally.  Cystic duct cystic artery clearly identified with 

blunt dissection.  Both structures divided after clips are being appropriately 

placed.  Then cautery used to remove the gallbladder from the liver bed.  

Bleeding on the liver bed controlled with cautery.  Gallbladder retrieved 

through the umbilicus via Endo Catch bag.  Right upper quadrant irrigated.  

Effluent clear with no evidence of bleeding or bile leakage appreciated.  All 

trochars removed under direct vision.  Stay sutures tied to each other to 

reapproximate the hernia defect.  Subcutaneous wounds irrigated bleeding 

controlled with cautery.  3-0 chromic used to reapproximate subcutaneous tissue 

and closed skin.  Sterile dressing applied.  Patient awakened taken to recovery 

in good general condition.


Transferred to: Recovery Room


Condition: Good

## 2022-04-11 NOTE — P.OP
Assistant: Georgie WILKES


Preoperative diagnosis: Chronic cholecystitis and cholelithiasis


Postoperative diagnosis: Same, umbilical hernia


Primary procedure: Laparoscopic cholecystectomy, repair of umbilical hernia


Anesthesia: General


Estimated blood loss: Minimal


Specimen: Gallbladder, hernia sac


Findings: As above


Operative Technique: 


Patient brought to the OR and placed in supine position.  General anesthesia 

begun and patient prepped and draped in the usual sterile fashion.  0.5% 

Marcaine infiltrated locally.  Patient had a 2 cm periumbilical hernia.  A 2 cm 

incision made over the umbilical hernia.  Subcutaneous tissue divided hernia sac

and contents identified and excised.  Hernia sac and contents sent to pathology 

as specimen.  #1 Vicryl stay suture placed.  Peritoneal cavity entered with 

sharp and blunt dissection under direct vision.  12 mm trocar placed into the 

peritoneal cavity under direct vision.  Pneumoperitoneum established.  5 mm 

trocar placed in the epigastric region just to the right middle midline.  Two 5 

mm trochars placed in the right subcostal region.  Laparoscopy revealed chronic 

inflammation of the gallbladder with omental adhesion.  The gallbladder was 

thick-walled as well.  Adhesions taken down with sharp and blunt dissection.  

Fundus identified and retracted superiorly.  Infundibulum identified and 

retracted inferolaterally.  Cystic duct cystic artery clearly identified with 

blunt dissection.  Both structures divided after clips are being appropriately 

placed.  Then cautery used to remove the gallbladder from the liver bed.  

Bleeding on the liver bed controlled with cautery.  Gallbladder retrieved 

through the umbilicus via Endo Catch bag.  Right upper quadrant irrigated.  

Effluent clear with no evidence of bleeding or bile leakage appreciated.  All 

trochars removed under direct vision.  Stay sutures tied to each other to 

reapproximate the hernia defect.  Subcutaneous wounds irrigated bleeding cont

rolled with cautery.  3-0 chromic used to reapproximate subcutaneous tissue and 

closed skin.  Sterile dressing applied.  Patient awakened taken to recovery in 

good general condition.